# Patient Record
Sex: MALE | Race: WHITE | URBAN - METROPOLITAN AREA
[De-identification: names, ages, dates, MRNs, and addresses within clinical notes are randomized per-mention and may not be internally consistent; named-entity substitution may affect disease eponyms.]

---

## 2023-09-08 ENCOUNTER — TELEPHONE (OUTPATIENT)
Dept: SURGERY | Age: 73
End: 2023-09-08

## 2023-09-08 NOTE — TELEPHONE ENCOUNTER
The patient was contacted and informed that there's a message into the referring clinician's office letting them know that Dr. Dania Dyer reviewed the patient's chart and is recommending a consultation and a Staged Excision scheduled. Information was disclosed to the referring clinician with Dr. Emilia Baeza first available (tentatively 10/11 at 11:30 & 10/17 at 2pm), pending if referring physician is in agreement to those dates. Informed pt that he would be notified by their office today or our office on Monday with the outcome.

## 2023-09-11 NOTE — TELEPHONE ENCOUNTER
I contacted pt to let him know the referring clinician's office is planning on sending him to another clinician. The message below is from ref MD office via email at 2:51 pm on 9/11/23:       Henny Duff! I am waiting to hear back from Dr. Che French office on if they can get him in. I sent them the path report but I was assured they would move people around for this MM but they needed to review the path first. I will call him.  Thank you,

## 2023-09-12 NOTE — TELEPHONE ENCOUNTER
The referring clinician's office emailed me back letting me know what there plan was to send pt to another clinician. There email reply below: \"Hello! I figured out the confusion. I tried both of them first, Dr. Dutch Santos office would not accept any referrals from us due to our office not being in [facility] and Dr. Niyah Barnett office was booked out at least 3 weeks so I was still looking around. Dr. Eunice Wu office was not supposed to schedule him without calling me back to let me know if they could get him in sooner which it looks like they did schedule him. He has an appointment on the 14th of September. I tried getting ahold of the patient but he has not answered my calls. Luckily he does have an appointment. Thank you,    Ms. Albarado\"